# Patient Record
Sex: MALE | Race: WHITE | NOT HISPANIC OR LATINO | Employment: FULL TIME | URBAN - METROPOLITAN AREA
[De-identification: names, ages, dates, MRNs, and addresses within clinical notes are randomized per-mention and may not be internally consistent; named-entity substitution may affect disease eponyms.]

---

## 2018-12-03 ENCOUNTER — TRANSCRIBE ORDERS (OUTPATIENT)
Dept: ADMINISTRATIVE | Facility: HOSPITAL | Age: 58
End: 2018-12-03

## 2018-12-03 DIAGNOSIS — G47.30 SLEEP APNEA, UNSPECIFIED TYPE: Primary | ICD-10-CM

## 2018-12-07 ENCOUNTER — OFFICE VISIT (OUTPATIENT)
Dept: SLEEP CENTER | Facility: CLINIC | Age: 58
End: 2018-12-07
Payer: COMMERCIAL

## 2018-12-07 VITALS
HEIGHT: 66 IN | HEART RATE: 68 BPM | BODY MASS INDEX: 36.16 KG/M2 | SYSTOLIC BLOOD PRESSURE: 110 MMHG | WEIGHT: 225 LBS | DIASTOLIC BLOOD PRESSURE: 80 MMHG

## 2018-12-07 DIAGNOSIS — J30.2 SEASONAL ALLERGIES: ICD-10-CM

## 2018-12-07 DIAGNOSIS — G47.30 SLEEP APNEA, UNSPECIFIED TYPE: Primary | ICD-10-CM

## 2018-12-07 DIAGNOSIS — G47.10 HYPERSOMNIA: ICD-10-CM

## 2018-12-07 DIAGNOSIS — E66.9 OBESITY (BMI 30-39.9): ICD-10-CM

## 2018-12-07 PROCEDURE — 99244 OFF/OP CNSLTJ NEW/EST MOD 40: CPT | Performed by: INTERNAL MEDICINE

## 2018-12-07 RX ORDER — OLOPATADINE HYDROCHLORIDE 1 MG/ML
1 SOLUTION/ DROPS OPHTHALMIC
COMMUNITY
Start: 2018-05-11 | End: 2019-05-11

## 2018-12-07 RX ORDER — METHYLPREDNISOLONE 4 MG/1
TABLET ORAL
COMMUNITY
Start: 2015-04-06

## 2018-12-07 RX ORDER — LORATADINE 10 MG/1
10 TABLET ORAL
COMMUNITY

## 2018-12-07 RX ORDER — BENZONATATE 100 MG/1
CAPSULE ORAL
Refills: 0 | COMMUNITY
Start: 2018-09-18

## 2018-12-07 RX ORDER — AMOXICILLIN 875 MG/1
875 TABLET, COATED ORAL
COMMUNITY
Start: 2015-04-06

## 2018-12-07 NOTE — PROGRESS NOTES
Consultation - [id number]  66 y o  male  :1960  SVQ:[id number]    Physician Requesting Consult: Aguila Borrego MD     Reason for Consult : At your kind request I saw this patient for initial sleep evaluation today  He has previously diagnosed obstructive sleep apnea for which she uses CPAP  He is here because of ongoing symptoms in spite of regular use  Results of prior studies are no longer available:  He has study was undertaken at a private lab in [facility] that is no longer in visits  He could not recall severity of his apnea  However he thinks the pressure setting on is machine is 8 cm H2O    PFSH, Problem List, Medications & Allergies were reviewed in EMR  He  has no past medical history on file  He has a current medication list which includes the following prescription(s): amoxicillin, benzonatate, fluticasone, loratadine, methylprednisolone, and olopatadine  HPI:  He tolerates CPAP and is experiencing no adverse effects  In fact, he is unable to sleep without it  However, recently he reports snoring and awakens with sore throat even with use of CPAP  His machine is around 11years old and he feels the pressure setting "may be off"  He is not aware of breathing difficulties during sleep  He reports no nasal symptoms or significant weight change  Restless Leg Syndrome: reports no suggestive symptoms  Parasomnia activity: no features reported   Sleep Routine: Typical Bedtime:  10:00 p m  Gets OOB: 6:00 a m  TIB:8 hrs Estimated [email or web address] hrs  Sleep latency:< 15 minutes Sleep Interruptions: infrequent x/night but recently he is sleep is being interrupted up to 4 times a night because of snoring  Awakens: spontaneously feeling not always refreshed  He has Excessive Daytime Sleepiness , feels like napping but I fight through it  On review, Fords Sleepiness Scale rated at   Habits: reports that he has never smoked   He has never used smokeless tobacco , reports that he drinks alcohol ,  reports that he does not use drugs  ,Caffeine use: limited , Exercise routine: none   Family History: Negative for sleep disturbance  ROS: reviewed & as attached  EXAM:    Vitals /80   Pulse 68   Ht 5' 5 5" (1 664 m)   Wt 102 kg (225 lb)   BMI 36 87 kg/m²     General  Well groomed male, appears stated age, in no apparent distress  Psychiatric  Alert and cooperative  Mental state appears normal  Judgement & Insight  good   Head   Craniofacial anatomy:retrognathia Sinuses: non- tender  TMJ: Normal     Eyes   EOM's intact, conjunctiva/corneas clear         Nasal Airway  is patent Septum:central, Mucous membranes:appear normal     Turbinates:  are normal  There is no rhinorrhea; No PND     Oral   Airway   crowded Tongue:Modified Mallampati class IV (only hard palate visible)  I was unable to visualize the posterior pharynx  Teeth:  Appear worn down      Neck    appears thick and there's extra fatty tissue; Neck Circumference: 43cm; Supple; no abnormal masses; Thyroid:normal  Trachea:central      Lymph    No Cervical or Submandibular Lymhadenopathy   Heart:    RRR; S1,S2 normal; no gallop; nomurmurs     Lungs   Respiratory Effort:normal  Air entry good bilaterally  No wheezes  No rales   Abdomen   Obese, Soft & non-tender     Extremities   No pedal edema  No clubbing or cyanosis  Skin   Skin is warm and dry; Color& Hydration good; no facial rashes or lesions    Neurologic  Speech is clear and coherent  CNII-XII intact  Rombergs Negative  Muscskeltl    Muscle bulk, tone and power WNL Gait:normal          IMPRESSION: Primary Sleep/Secondary(to Medical or Psych conditions) & comorbidities   1  Sleep apnea, unspecified type  Ambulatory referral to Sleep Medicine    Split Study   2  Hypersomnia     3  Obesity (BMI 30-39 9)     4  Seasonal allergies        PLAN:   1   Comprehensive counseling was provided on pathophysiology, diagnostic strategies & treatment options; effects on symptoms and comorbidities; risks of inadequate therapy; costs and insurance aspects  2  I advised on weight reduction, avoiding sleeping supine, using alcohol or sedating medications close to bed time and on safe driving practices  3  Patient is willing to continue Positive airway pressure therapy  4  Repeat  Nocturnal polysomnography is indicated and a split study will be scheduled  Treatment to be initiated for AHI of greater than 5 per hour  5  In the interim, I gave him a prescription for empiric increase of his pressure to 12 cm H2O   6  The patient's current unit has now reached its 5 yr reasonable, useful life and needs to be replaced  7  Follow-up will be scheduled after the studies to review results, further details of treatment options and to initiate/adjust therapy  Thank you for allowing me to participate in the care of this patient  I will keep you apprised of developments          Sincerely,     Authenticated electronically by Mack Donnelly MD   on 87/59/96   Board Certified Specialist

## 2018-12-07 NOTE — PROGRESS NOTES
Review of Systems      Genitourinary none   Cardiology none   Gastrointestinal none   Neurology none   Constitutional none   Integumentary rash or dry skin   Psychiatry none   Musculoskeletal none   Pulmonary snoring   ENT none   Endocrine none   Hematological none       Eczema

## 2018-12-18 ENCOUNTER — TELEPHONE (OUTPATIENT)
Dept: SLEEP CENTER | Facility: CLINIC | Age: 58
End: 2018-12-18

## 2018-12-18 NOTE — TELEPHONE ENCOUNTER
Pt called and stated script for pressure change was never received by [facility]  Informed pt that I will print script and fax it over to them

## 2019-01-02 ENCOUNTER — HOSPITAL ENCOUNTER (OUTPATIENT)
Dept: SLEEP CENTER | Facility: CLINIC | Age: 59
Discharge: HOME | End: 2019-01-02
Payer: COMMERCIAL

## 2019-01-02 DIAGNOSIS — G47.30 SLEEP APNEA, UNSPECIFIED TYPE: ICD-10-CM

## 2019-01-02 PROCEDURE — 95811 POLYSOM 6/>YRS CPAP 4/> PARM: CPT

## 2019-01-03 DIAGNOSIS — G47.33 OSA (OBSTRUCTIVE SLEEP APNEA): Primary | ICD-10-CM

## 2019-01-03 NOTE — PROGRESS NOTES
Sleep Study Documentation    Pre-Sleep Study       Sleep testing procedure explained to patient:YES    Patient napped prior to study:NO    Caffeine:Dayshift worker after 12PM   Caffeine use:NO    Alcohol:Dayshift workers after 5PM: Alcohol use:NO    Typical day for patient:YES       Study Documentation  Split Optimal PAP pressure: 19/15  Leak:None  Snore:Eliminated  REM Obtained:yes  Supplemental O2: no    Minimum SaO2 72  Baseline SaO2 98  PAP mask tried (list all)Quattro FX  PAP mask choice (final)Quattro FX  PAP mask type:full face  PAP pressure at which snoring was eliminated 10  Minimum SaO2 at final PAP pressure 91  Mode of Therapy:BiPAP  ETCO2:No  CPAP changed to BiPAP:Yes  If yes why Better tolerance and comfort due to need for higher pressure    Mode of Therapy:BiPAP    EKG abnormalities: no     EEG abnormalities: no    Study Terminated:no    Patient classification: employed       Post-Sleep Study    Medication used at bedtime or during sleep study:NO    Patient reports time it took to fall asleep:20 to 30 minutes    Patient reports waking up during study:3 or more times  Patient reports returning to sleep in 10 to 30 minutes  Patient reports sleeping 4 to 6 hours with dreaming  Patient reports sleep during study:better than usual    Patient rated sleepiness: Somewhat sleepy or tired    PAP treatment:yes: Post PAP treatment patient reports feeling better and  would wear PAP mask at home

## 2019-01-10 ENCOUNTER — TELEPHONE (OUTPATIENT)
Dept: SLEEP CENTER | Facility: CLINIC | Age: 59
End: 2019-01-10

## 2019-01-10 NOTE — TELEPHONE ENCOUNTER
Advised patient that sleep study is resulted  DME appt for 1/24/2019 for replacement of broken machine, and F/u with Dr Nanette Morgan on 2/15/2019   RX and paperwork to [facility] patient instructed to take mask

## 2019-03-29 ENCOUNTER — OFFICE VISIT (OUTPATIENT)
Dept: SLEEP CENTER | Facility: CLINIC | Age: 59
End: 2019-03-29
Payer: COMMERCIAL

## 2019-03-29 VITALS
DIASTOLIC BLOOD PRESSURE: 70 MMHG | HEART RATE: 83 BPM | SYSTOLIC BLOOD PRESSURE: 110 MMHG | BODY MASS INDEX: 38.92 KG/M2 | HEIGHT: 65 IN | WEIGHT: 233.6 LBS

## 2019-03-29 DIAGNOSIS — E66.9 OBESITY (BMI 30-39.9): ICD-10-CM

## 2019-03-29 DIAGNOSIS — G47.10 HYPERSOMNIA: ICD-10-CM

## 2019-03-29 DIAGNOSIS — J30.2 SEASONAL ALLERGIES: ICD-10-CM

## 2019-03-29 DIAGNOSIS — G47.33 OSA (OBSTRUCTIVE SLEEP APNEA): Primary | ICD-10-CM

## 2019-03-29 PROCEDURE — 99214 OFFICE O/P EST MOD 30 MIN: CPT | Performed by: INTERNAL MEDICINE

## 2019-03-29 NOTE — PROGRESS NOTES
Follow-Up Note - Sleep Center   Andrzej Stauffer  64 y o  male  :1960  OCK:[id number]    CC: I saw this patient for follow-up in clinic today for his Sleep Disordered Breathing, Coexisting Sleep and Medical Problems  PFSH, Problem List, Medications & Allergies were reviewed in EMR  Interval changes: none reported  He  has no past medical history on file  He has a current medication list which includes the following prescription(s): amoxicillin, benzonatate, fluticasone, loratadine, methylprednisolone, and olopatadine  ROS: Reviewed (see attached)  Significant for episodic snoring even with BiPAP  He has seasonal allergies that are controlled  DATA REVIEWED:  using PAP > 4 hours/night 100% of the time  Estimated RAMAN 2/hour at pressure of 19/15cm H2O     SUBJECTIVE: Regarding use of PAP, Tyron Marsh reports:   · He is experiencing no  adverse effects:   · He is   benefiting from use: sleeping better   Sleep Routine: He reports getting 8-1/2 hours sleep  ; he has no difficulty initiating or maintaining sleep   He awakens spontaneously and feels refreshed  He significantly improved  excessive drowsiness   He rated himself at Total score: 12 /24 on the Fletcher sleepiness scale  Habits: reports that he has never smoked  He has never used smokeless tobacco ,  reports that he drinks alcohol ,  reports that he does not use drugs  , Caffeine use: limited , Exercise routine: regular    OBJECTIVE: /70   Pulse 83   Ht 5' 5" (1 651 m)   Wt 106 kg (233 lb 9 6 oz)   BMI 38 87 kg/m²    Constitutional: Patient is well groomed; well appearing  Skin/Extrem: warm & dry; col & hydration normal; no edema  Psych: cooperativeand in no distress  Normal mood, affect & thought   CNS: Alert, orientated, clear & coherent speech  H&N: EOMI; NC/AT:no facial pressure marks, no rashes  Neck Circumference: 43 cm  ENMT Mucus membranes normal Nasal airway:patent  Oral airway: crowded     Resp:effort is normal CVS: RRR ABD:truncal obesity MSK:Gait normal     ASSESSMENT: Primary Sleep/Secondary(to Medical or Psych conditions) & comorbidities   1  IFEANYI (obstructive sleep apnea)  PAP DME Resupply/Reorder   2  Hypersomnia     3  Seasonal allergies     4  Obesity (BMI 30-39  9)         PLAN:  1  Treatment with  PAP is medically necessary and Neoma Saver is agreable to continue use  2  Care of equipment, methods to improve comfort using PAP and importance of compliance with therapy were discussed  3  Pressure setting: continue 19/15 cm H2O  He declined pressure increase  4  Rx provided to replace supplies and Care coordinated with DME provider  5  He is starting a program for weight reduction that should remediate the episodic snoring  6  Follow-up is advised in 1 year or sooner if needed to monitor progress, compliance and to adjust therapy  Thank you for allowing me to participate in the care of this patient      Sincerely,    Authenticated electronically by Prabhakar Franco MD on 93/69/61   Board Certified Specialist

## 2019-03-29 NOTE — PROGRESS NOTES
Review of Systems      Genitourinary none   Cardiology none   Gastrointestinal none   Neurology none   Constitutional none   Integumentary none   Psychiatry none   Musculoskeletal none   Pulmonary snoring   ENT none   Endocrine none   Hematological none

## 2019-07-08 ENCOUNTER — TELEPHONE (OUTPATIENT)
Dept: SLEEP CENTER | Facility: CLINIC | Age: 59
End: 2019-07-08